# Patient Record
Sex: MALE | Race: WHITE | Employment: FULL TIME | URBAN - NONMETROPOLITAN AREA
[De-identification: names, ages, dates, MRNs, and addresses within clinical notes are randomized per-mention and may not be internally consistent; named-entity substitution may affect disease eponyms.]

---

## 2017-01-15 PROBLEM — R07.9 CHEST PAIN: Status: ACTIVE | Noted: 2017-01-15

## 2017-01-15 PROBLEM — Z98.890 S/P CARDIAC CATHETERIZATION: Status: ACTIVE | Noted: 2017-01-15

## 2017-01-15 PROBLEM — I21.4 NSTEMI (NON-ST ELEVATED MYOCARDIAL INFARCTION) (HCC): Status: ACTIVE | Noted: 2017-01-15

## 2017-01-15 PROBLEM — I30.1 ACUTE INFECTIVE PERICARDITIS: Status: ACTIVE | Noted: 2017-01-15

## 2017-01-17 ENCOUNTER — TELEPHONE (OUTPATIENT)
Dept: CARDIOLOGY | Age: 37
End: 2017-01-17

## 2017-01-25 ENCOUNTER — TELEPHONE (OUTPATIENT)
Dept: CARDIOLOGY | Age: 37
End: 2017-01-25

## 2017-03-29 ENCOUNTER — OFFICE VISIT (OUTPATIENT)
Dept: CARDIOLOGY | Age: 37
End: 2017-03-29

## 2017-03-29 VITALS
WEIGHT: 230.6 LBS | DIASTOLIC BLOOD PRESSURE: 84 MMHG | BODY MASS INDEX: 31.23 KG/M2 | HEART RATE: 76 BPM | HEIGHT: 72 IN | SYSTOLIC BLOOD PRESSURE: 138 MMHG

## 2017-03-29 DIAGNOSIS — Z98.890 S/P CARDIAC CATHETERIZATION: Primary | ICD-10-CM

## 2017-03-29 DIAGNOSIS — I21.4 NSTEMI (NON-ST ELEVATED MYOCARDIAL INFARCTION) (HCC): ICD-10-CM

## 2017-03-29 PROCEDURE — 93000 ELECTROCARDIOGRAM COMPLETE: CPT | Performed by: INTERNAL MEDICINE

## 2017-03-29 PROCEDURE — 99213 OFFICE O/P EST LOW 20 MIN: CPT | Performed by: INTERNAL MEDICINE

## 2017-03-29 ASSESSMENT — ENCOUNTER SYMPTOMS
GASTROINTESTINAL NEGATIVE: 1
RESPIRATORY NEGATIVE: 1
EYES NEGATIVE: 1

## 2017-10-14 ENCOUNTER — HOSPITAL ENCOUNTER (EMERGENCY)
Age: 37
Discharge: HOME | End: 2017-10-14
Attending: FAMILY MEDICINE
Payer: COMMERCIAL

## 2017-10-14 ENCOUNTER — APPOINTMENT (OUTPATIENT)
Dept: INTERVENTIONAL RADIOLOGY/VASCULAR | Age: 37
End: 2017-10-14
Payer: COMMERCIAL

## 2017-10-14 VITALS
SYSTOLIC BLOOD PRESSURE: 137 MMHG | HEIGHT: 72 IN | TEMPERATURE: 97.6 F | BODY MASS INDEX: 31.83 KG/M2 | DIASTOLIC BLOOD PRESSURE: 79 MMHG | RESPIRATION RATE: 18 BRPM | HEART RATE: 66 BPM | WEIGHT: 235 LBS | OXYGEN SATURATION: 96 %

## 2017-10-14 DIAGNOSIS — M79.89 PAIN AND SWELLING OF RIGHT LOWER LEG: ICD-10-CM

## 2017-10-14 DIAGNOSIS — M79.661 PAIN AND SWELLING OF RIGHT LOWER LEG: ICD-10-CM

## 2017-10-14 DIAGNOSIS — S86.111A GASTROCNEMIUS MUSCLE TEAR, RIGHT, INITIAL ENCOUNTER: ICD-10-CM

## 2017-10-14 DIAGNOSIS — M79.661 RIGHT CALF PAIN: Primary | ICD-10-CM

## 2017-10-14 PROCEDURE — 93971 EXTREMITY STUDY: CPT

## 2017-10-14 PROCEDURE — 99215 OFFICE O/P EST HI 40 MIN: CPT

## 2017-10-14 PROCEDURE — 99203 OFFICE O/P NEW LOW 30 MIN: CPT | Performed by: NURSE PRACTITIONER

## 2017-10-14 PROCEDURE — 99283 EMERGENCY DEPT VISIT LOW MDM: CPT

## 2017-10-14 ASSESSMENT — ENCOUNTER SYMPTOMS
ALLERGIC/IMMUNOLOGIC NEGATIVE: 1
GASTROINTESTINAL NEGATIVE: 1
BACK PAIN: 0
RESPIRATORY NEGATIVE: 1
EYES NEGATIVE: 1

## 2017-10-14 ASSESSMENT — PAIN DESCRIPTION - FREQUENCY: FREQUENCY: INTERMITTENT

## 2017-10-14 ASSESSMENT — PAIN DESCRIPTION - PAIN TYPE: TYPE: ACUTE PAIN

## 2017-10-14 ASSESSMENT — PAIN DESCRIPTION - ORIENTATION: ORIENTATION: RIGHT

## 2017-10-14 ASSESSMENT — PAIN DESCRIPTION - DESCRIPTORS: DESCRIPTORS: SHARP;SHOOTING

## 2017-10-14 ASSESSMENT — PAIN DESCRIPTION - LOCATION: LOCATION: LEG

## 2017-10-14 ASSESSMENT — PAIN SCALES - GENERAL: PAINLEVEL_OUTOF10: 7

## 2017-10-14 ASSESSMENT — PAIN DESCRIPTION - ONSET: ONSET: SUDDEN

## 2017-10-14 NOTE — ED PROVIDER NOTES
[facility]  Urgent Care Encounter      CHIEF COMPLAINT       Chief Complaint   Patient presents with    Leg Pain     Right Calf       Nurses Notes reviewed and I agree except as noted in the HPI. HISTORY OF PRESENT ILLNESS   Ata Sparrow is a 40 y.o. male who presents with complaint of right calf pain and swelling. Onset today. He reports that he has injured his right calf muscle several times over the last couple months. Initially he stepped wrong on his right foot and felt something pop in his right calf muscle. The pain resolved and never had any swelling. He reinjured his calf muscle again several weeks later, but again the pain resolved and never had any swelling. He reports that he stepped wrong today and felt something pop in his right calf. Has persistent pain and his calf has started to swell, came for evaluation. Denies any CP/SOB. REVIEW OF SYSTEMS     Review of Systems   Constitutional: Negative. HENT: Negative. Eyes: Negative. Respiratory: Negative. Cardiovascular: Negative. Gastrointestinal: Negative. Endocrine: Negative. Genitourinary: Negative. Musculoskeletal: Positive for gait problem, joint swelling and myalgias. Negative for arthralgias, back pain, neck pain and neck stiffness. Skin: Negative. Allergic/Immunologic: Negative. Hematological: Negative. Psychiatric/Behavioral: Negative. PAST MEDICAL HISTORY         Diagnosis Date    S/P cardiac catheterization: 1/15/2017: NOrmal coronaries. LVEF 50%. 1/15/2017    1/15/2017: NOrmal coronaries. LVEF 50%. Dr. Medardo Freeman     Patient  has no past surgical history on file.     CURRENT MEDICATIONS       Previous Medications    ASCORBIC ACID (VITAMIN C) 500 MG TABLET    Take 500 mg by mouth as needed    CHOLECALCIFEROL (VITAMIN D3) 2000 UNITS CAPS    Take 2 capsules by mouth daily    FENUGREEK PO    Take by mouth    THYME OIL OIL    by Does not apply route ALLERGIES     Patient is has No Known Allergies. FAMILY HISTORY     Patient's family history includes Heart Disease in his father. SOCIAL HISTORY     Patient  reports that he quit smoking about 9 years ago. His smoking use included Cigarettes. He has a 1.25 pack-year smoking history. He has never used smokeless tobacco. He reports that he does not drink alcohol or use drugs. PHYSICAL EXAM     ED TRIAGE VITALS  BP: 137/79, Temp: 98.6 °F (37 °C), Pulse: 76, Resp: 16, SpO2: 97 %  Physical Exam   Constitutional: He is oriented to person, place, and time. He appears well-developed and well-nourished. HENT:   Head: Normocephalic and atraumatic. Eyes: Pupils are equal, round, and reactive to light. Neck: Normal range of motion. Neck supple. Cardiovascular: Normal rate and regular rhythm. Pulmonary/Chest: Effort normal and breath sounds normal.   Abdominal: Soft. Bowel sounds are normal.   Musculoskeletal: Normal range of motion. He exhibits edema and tenderness. He exhibits no deformity. Right lower leg: He exhibits tenderness, swelling and edema. He exhibits no bony tenderness, no deformity and no laceration. Legs:  Neurological: He is alert and oriented to person, place, and time. Skin: Skin is warm and dry. Psychiatric: He has a normal mood and affect. His behavior is normal. Judgment and thought content normal.   Nursing note and vitals reviewed. DIAGNOSTIC RESULTS   Labs:No results found for this visit on 10/14/17. IMAGING:  No orders to display     URGENT CARE COURSE:     Vitals:    10/14/17 1826   BP: 137/79   Pulse: 76   Resp: 16   Temp: 98.6 °F (37 °C)   TempSrc: Temporal   SpO2: 97%   Weight: 235 lb (106.6 kg)   Height: 6' (1.829 m)       Medications - No data to display  PROCEDURES:  None  FINAL IMPRESSION      1. Right calf pain    2.  Pain and swelling of right lower leg      Doubt blood clot, likely swelling due to muscle injury  But needs to have blood clot

## 2017-10-14 NOTE — ED PROVIDER NOTES
[facility] [address] [address] EMERGENCY DEPT      CHIEF COMPLAINT       Chief Complaint   Patient presents with    Leg Pain     Right Calf       Nurses Notes reviewed and I agree except as noted in the HPI. HISTORY OF PRESENT ILLNESS    Veronika Lopez is a 40 y.o. male who presents to the emergency department from a local urgent care with pain and swelling in his right calf. The patient injured his right calf several weeks ago when he stepped from an 18 inch surface, felt a pop and assumed he pulled his calf muscle. Since then he has reinjured that area several times, always resulting in pain in the calf. This morning stepped off elevated step and felt pop in right calf. For the first time his right calf appears swollen to him. He took some ibuprofen and went to the urgent care where the provider was concerned for possible blood clot in the calf. In The emergency department the patient complains of pain with palpation or when he walks in his calf muscle that is improved with plantar and dorsiflexion. His wife was concerned and specifically requests an ultrasound be done of the leg. Pertinent past medical history is negative for previous DVTs or PEs. The patient does not use hormones, is not a smoker, has not been immobilized for any length of time. REVIEW OF SYSTEMS     Constitutional:  Denies fever, chills  Respiratory:  Denies shortness of breath  Cardiovascular:  Denies chest pain or palpitations  Musculoskeletal:  Denies pain proximal to the calf or distal to the calf  Skin:  Denies rash or bruising on the affected extremity   Neurologic:  Denies numbness  or tingling in the affected extremity     PAST MEDICAL HISTORY    has a past medical history of S/P cardiac catheterization: 1/15/2017: NOrmal coronaries. LVEF 50%. .    SURGICAL HISTORY      has no past surgical history on file.     CURRENT MEDICATIONS       Discharge Medication List as of 10/14/2017  9:48 PM      CONTINUE these medications which have NOT CHANGED    Details   FENUGREEK PO Take by mouth      Thyme Oil OIL by Does not apply route      Ascorbic Acid (VITAMIN C) 500 MG tablet Take 500 mg by mouth as needed      Cholecalciferol (VITAMIN D3) 2000 UNITS CAPS Take 2 capsules by mouth daily             ALLERGIES     has No Known Allergies. FAMILY HISTORY     indicated that his father is . family history includes Heart Disease in his father. SOCIAL HISTORY      reports that he quit smoking about 9 years ago. His smoking use included Cigarettes. He has a 1.25 pack-year smoking history. He has never used smokeless tobacco. He reports that he does not drink alcohol or use drugs. PHYSICAL EXAM     INITIAL VITALS:  height is 6' (1.829 m) and weight is 235 lb (106.6 kg). His temperature is 97.6 °F (36.4 °C). His blood pressure is 137/79 and his pulse is 66. His respiration is 18 and oxygen saturation is 96%. Constitutional:  Well developed, Well nourished, No acute distress, Non-toxic appearance. HENT:  Normocephalic, Atraumatic  Neck: Supple, normal range of motion. Respiratory:  Breathing is non-labored on room air, normal breath sounds, no wheezing, rhonchi, rales or cough. Cardiovascular:  Normal heart rate, Normal rhythm, No murmurs, No rubs, No gallops. GI:   No inguinal adenopathy or tenderness   Musculoskeletal: Right lower extremity has full range of motion at the hip, knee, ankle. Dorsalis pedis is 2+ and foot is warm and strong. There is tenderness diffusely in the calf muscle and focally in the medial aspect. Homans sign is significant for 2 beats of clonus. .  Integument:  Warm, Dry,  No rash (on exposed areas), no bruising. DIFFERENTIAL DIAGNOSIS:   Contusion versus muscle tear. Less likely DVT.     DIAGNOSTIC RESULTS       RADIOLOGY: non-plain film images(s) such as CT, Ultrasound and MRI are read by the radiologist.  Plain radiographic images are visualized and preliminarily interpreted by the emergency days  directly for further evaluation    [facility] [place] [address] [id number] [facility]:  Discharge Medication List as of 10/14/2017  9:48 PM          (Please note that portions of this note were completed with a voice recognition program.  Efforts were made to edit the dictations but occasionally words are mis-transcribed.)    MD Tanisha Moore MD  10/15/17 2671

## 2018-03-01 ENCOUNTER — TELEPHONE (OUTPATIENT)
Dept: CARDIOLOGY CLINIC | Age: 38
End: 2018-03-01

## 2018-03-07 ENCOUNTER — OFFICE VISIT (OUTPATIENT)
Dept: CARDIOLOGY CLINIC | Age: 38
End: 2018-03-07
Payer: COMMERCIAL

## 2018-03-07 VITALS
HEIGHT: 72 IN | SYSTOLIC BLOOD PRESSURE: 138 MMHG | WEIGHT: 227 LBS | DIASTOLIC BLOOD PRESSURE: 78 MMHG | BODY MASS INDEX: 30.75 KG/M2 | HEART RATE: 75 BPM

## 2018-03-07 DIAGNOSIS — Z98.890 S/P CARDIAC CATHETERIZATION: ICD-10-CM

## 2018-03-07 DIAGNOSIS — I21.4 NSTEMI (NON-ST ELEVATED MYOCARDIAL INFARCTION) (HCC): ICD-10-CM

## 2018-03-07 DIAGNOSIS — R00.2 PALPITATIONS: Primary | ICD-10-CM

## 2018-03-07 PROCEDURE — 99213 OFFICE O/P EST LOW 20 MIN: CPT | Performed by: INTERNAL MEDICINE

## 2018-03-07 PROCEDURE — 93000 ELECTROCARDIOGRAM COMPLETE: CPT | Performed by: INTERNAL MEDICINE

## 2018-03-07 ASSESSMENT — ENCOUNTER SYMPTOMS
RESPIRATORY NEGATIVE: 1
EYES NEGATIVE: 1
GASTROINTESTINAL NEGATIVE: 1

## 2018-03-07 NOTE — PROGRESS NOTES
Exam   Constitutional: He is oriented to person, place, and time. He appears well-developed and well-nourished. No distress. HENT:   Head: Normocephalic and atraumatic. Mouth/Throat: Oropharynx is clear and moist.   Eyes: Conjunctivae and EOM are normal. Pupils are equal, round, and reactive to light. No scleral icterus. Neck: Normal range of motion. Neck supple. No JVD present. No thyromegaly present. Cardiovascular: Normal rate, regular rhythm and normal heart sounds. Pulses:       Radial pulses are 2+ on the right side, and 2+ on the left side. Femoral pulses are 2+ on the right side, and 2+ on the left side. Popliteal pulses are 2+ on the right side, and 2+ on the left side. Dorsalis pedis pulses are 2+ on the right side, and 2+ on the left side. Posterior tibial pulses are 2+ on the right side, and 2+ on the left side. Pulmonary/Chest: Effort normal and breath sounds normal.   Abdominal: Soft. Bowel sounds are normal. He exhibits no mass. There is no tenderness. Musculoskeletal: He exhibits no edema. Lymphadenopathy:     He has no cervical adenopathy. Neurological: He is alert and oriented to person, place, and time. He has normal reflexes. No cranial nerve deficit. Skin: Skin is warm. No rash noted. Psychiatric: He has a normal mood and affect.        /78   Pulse 75   Ht 6' (1.829 m)   Wt 227 lb (103 kg)   BMI 30.79 kg/m²   Wt Readings from Last 3 Encounters:   03/07/18 227 lb (103 kg)   10/14/17 235 lb (106.6 kg)   03/29/17 230 lb 9.6 oz (104.6 kg)     BP Readings from Last 3 Encounters:   03/07/18 138/78   10/14/17 137/79   03/29/17 138/84       Lab Data  CBC:   Lab Results   Component Value Date    WBC 9.9 01/15/2017    RBC 4.72 01/15/2017    HGB 13.9 01/15/2017    HCT 40.8 01/15/2017    MCV 86.5 01/15/2017    MCH 29.5 01/15/2017    MCHC 34.1 01/15/2017    RDW 12.7 01/15/2017     01/15/2017    MPV 8.8 01/15/2017     CMP:    Lab Results Component Value Date     01/15/2017    K 4.5 01/15/2017    CL 98 01/15/2017    CO2 27 01/15/2017    BUN 15 01/15/2017    CREATININE 0.9 01/15/2017    LABGLOM >90 01/15/2017    GLUCOSE 135 01/15/2017    CALCIUM 8.5 01/15/2017     Hepatic Function Panel:  No results found for: ALKPHOS, ALT, AST, PROT, BILITOT, BILIDIR, IBILI, LABALBU  Magnesium:  No results found for: MG  PT/INR:  No results found for: PROTIME, INR  HgBA1c:  No results found for: LABA1C  FLP:    Lab Results   Component Value Date    TRIG 119 01/15/2017    TRIG 99 01/15/2017    HDL 46 01/15/2017    HDL 45 01/15/2017    LDLCALC 83 01/15/2017    LDLCALC 85 01/15/2017     TSH:    Lab Results   Component Value Date    TSH 2.370 01/15/2017     No results for input(s): CKTOTAL, CKMB, CKMBINDEX, TROPONINI in the last 72 hours. Assessment:   Mr. Shanna Friend is a 40 y.o. who is known to us with history of hospital admission with chest pain and positive troponin in 2017. He had normal cardiac cath. The patient presented with chest pain. He was transferred for [facility]. episode of chest pain. The patient was described as \"Feels like a pressure or tightness in center of my chest\". The patient reported that his father  oh MI at the age of 40  He denies recurrence of chest pain. Echocardiogram 2017:  Normal left ventricle size and systolic function. Ejection fraction was   estimated at 55 %. There were no regional left ventricular wall motion   abnormalities and wall thickness was within normal limits.   Left atrial size was normal.   The aortic valve was trileaflet with normal thickness and cuspal   separation.   Trivial tricuspid regurgitation visualized.   No evidence of any pericardial effusion. The following diagnoses were addressed during this visit:  1. Palpitations  EKG 12 Lead   2. NSTEMI (non-ST elevated myocardial infarction) (HCC)  EKG 12 Lead   3. S/P cardiac catheterization: 1/15/2017: NOrmal coronaries. LVEF 50%.   EKG 12

## 2018-03-12 ENCOUNTER — HOSPITAL ENCOUNTER (OUTPATIENT)
Dept: NON INVASIVE DIAGNOSTICS | Age: 38
Discharge: HOME | End: 2018-03-12
Payer: COMMERCIAL

## 2018-03-12 DIAGNOSIS — Z98.890 S/P CARDIAC CATHETERIZATION: ICD-10-CM

## 2018-03-12 DIAGNOSIS — I21.4 NSTEMI (NON-ST ELEVATED MYOCARDIAL INFARCTION) (HCC): ICD-10-CM

## 2018-03-12 DIAGNOSIS — R00.2 PALPITATIONS: ICD-10-CM

## 2018-03-12 PROCEDURE — 93226 XTRNL ECG REC<48 HR SCAN A/R: CPT

## 2018-03-12 PROCEDURE — 93225 XTRNL ECG REC<48 HRS REC: CPT

## 2018-03-17 NOTE — PROCEDURES
[address] [place] [address]                                  HOLTER MONITOR    PATIENT NAME: Geremias Wiley                    :        1980  MED REC NO:   [id number]                           ROOM:  ACCOUNT NO:   [de-identified]                           ADMIT DATE: 2018  PROVIDER:     Jeffrey Marcum. OZ Cali STUDY:  2018    DURATION:  48 hours. QUALITY:  Reasonable. INDICATION:  Palpitation. FINDINGS:  The patient is in normal sinus rhythm. Average heart rate is 75  beats per minute, ranging from 45 to 159 beats per minute. Maximum R to R  interval is 1.6 seconds at 08:30 a.m. There are 6 ventricular ectopic  beats, all isolated; 15 supraventricular ectopic beats of which 13  isolated, one couplet. No other form of arrhythmia. Diary presented and  three episodes of palpitation entry noted. One episode associated with one  supraventricular ectopic beat. The other episode associated and correlated  with sinus rhythm at 93 beats per minute. No ectopic beat. The other is  correlated with sinus tachycardia, rate 125 beats minute. Otherwise, the  patient also has other complaint of heart racing associated with sinus  tachycardia, rate 133 beats per minute. No symptoms when the patient has  fast heart rate of 159. CONCLUSION:  This is a normal sinus rhythm. Average heart rate 75 beats  per minute, ranging from 45 to 159 beats per minute. No significant pause  of more than 1.6 seconds noted. Six ventricular ectopic beats noted, all isolated. 15 supraventricular  ectopic beats noted of which 13 isolated, one couplet. On the Holter monitor, the palpitation and heart racing correlates with one  supraventricular ectopic beat with sinus rhythm and with sinus tachycardia,  so otherwise no significant arrhythmia which really explains the  palpitation of the patient. So, certainly, this Holter monitor does not  explain the palpitation of the patient. No ectopic beats really and most  of the palpitation correlates with sinus rhythm with normal rate or sinus  tachycardia. Otherwise, there is only one time that one ventricular  ectopic beat associated with palpitation, so basically, I feel there is no  correlation. In conclusion, no correlation noted. Lucy Lutz.  Leigh Ledesma M.D.    D: 03/16/2018 21:29:42       T: 03/17/2018 2:53:45     [email or web address]  Job#: [id number]     Doc#: [id number]    CC:

## 2018-03-20 ENCOUNTER — TELEPHONE (OUTPATIENT)
Dept: CARDIOLOGY CLINIC | Age: 38
End: 2018-03-20

## 2018-12-10 ENCOUNTER — TELEPHONE (OUTPATIENT)
Dept: CARDIOLOGY CLINIC | Age: 38
End: 2018-12-10

## 2019-01-31 ENCOUNTER — OFFICE VISIT (OUTPATIENT)
Dept: CARDIOLOGY CLINIC | Age: 39
End: 2019-01-31
Payer: COMMERCIAL

## 2019-01-31 VITALS
BODY MASS INDEX: 31.69 KG/M2 | HEIGHT: 72 IN | WEIGHT: 234 LBS | SYSTOLIC BLOOD PRESSURE: 123 MMHG | DIASTOLIC BLOOD PRESSURE: 80 MMHG | HEART RATE: 80 BPM

## 2019-01-31 DIAGNOSIS — I42.9 CARDIOMYOPATHY, UNSPECIFIED TYPE (HCC): Primary | ICD-10-CM

## 2019-01-31 PROCEDURE — 99213 OFFICE O/P EST LOW 20 MIN: CPT | Performed by: INTERNAL MEDICINE

## 2019-10-06 ENCOUNTER — HOSPITAL ENCOUNTER (EMERGENCY)
Age: 39
Discharge: HOME | End: 2019-10-06
Attending: EMERGENCY MEDICINE
Payer: COMMERCIAL

## 2019-10-06 VITALS
DIASTOLIC BLOOD PRESSURE: 78 MMHG | HEIGHT: 72 IN | BODY MASS INDEX: 31.83 KG/M2 | RESPIRATION RATE: 16 BRPM | TEMPERATURE: 97.7 F | OXYGEN SATURATION: 96 % | WEIGHT: 235 LBS | HEART RATE: 75 BPM | SYSTOLIC BLOOD PRESSURE: 137 MMHG

## 2019-10-06 DIAGNOSIS — S39.012A ACUTE MYOFASCIAL STRAIN OF LUMBOSACRAL REGION, INITIAL ENCOUNTER: Primary | ICD-10-CM

## 2019-10-06 LAB
BILIRUBIN URINE: NEGATIVE
BLOOD, URINE: NEGATIVE
CHARACTER, URINE: CLEAR
COLOR: YELLOW
GLUCOSE, URINE: NEGATIVE MG/DL
KETONES, URINE: NEGATIVE
LEUKOCYTES, UA: NEGATIVE
NITRATE, UA: NEGATIVE
PH UA: 7 (ref 5–9)
PROTEIN UA: NEGATIVE MG/DL
REFLEX TO URINE C & S: NORMAL
SPECIFIC GRAVITY UA: <= 1.005 (ref 1–1.03)
UROBILINOGEN, URINE: 0.2 EU/DL (ref 0–1)

## 2019-10-06 PROCEDURE — 99214 OFFICE O/P EST MOD 30 MIN: CPT

## 2019-10-06 PROCEDURE — 99213 OFFICE O/P EST LOW 20 MIN: CPT | Performed by: EMERGENCY MEDICINE

## 2019-10-06 PROCEDURE — 81003 URINALYSIS AUTO W/O SCOPE: CPT

## 2019-10-06 RX ORDER — TIZANIDINE 4 MG/1
4 TABLET ORAL EVERY 6 HOURS PRN
Qty: 15 TABLET | Refills: 0 | Status: SHIPPED | OUTPATIENT
Start: 2019-10-06 | End: 2020-01-13

## 2019-10-06 RX ORDER — NAPROXEN 500 MG/1
500 TABLET ORAL 2 TIMES DAILY WITH MEALS
Qty: 20 TABLET | Refills: 0 | Status: SHIPPED | OUTPATIENT
Start: 2019-10-06 | End: 2021-10-21

## 2019-10-06 ASSESSMENT — PAIN DESCRIPTION - LOCATION: LOCATION: BACK

## 2019-10-06 ASSESSMENT — ENCOUNTER SYMPTOMS
EYE PAIN: 0
VOMITING: 0
SHORTNESS OF BREATH: 0
STRIDOR: 0
DIARRHEA: 0
EYE DISCHARGE: 0
TROUBLE SWALLOWING: 0
BACK PAIN: 1
NAUSEA: 0
ABDOMINAL PAIN: 0
SINUS PRESSURE: 0
VOICE CHANGE: 0
WHEEZING: 0
SORE THROAT: 0
EYE REDNESS: 0
COUGH: 0

## 2019-10-06 ASSESSMENT — PAIN SCALES - GENERAL: PAINLEVEL_OUTOF10: 2

## 2020-01-13 ENCOUNTER — HOSPITAL ENCOUNTER (EMERGENCY)
Age: 40
Discharge: HOME | End: 2020-01-13
Payer: COMMERCIAL

## 2020-01-13 VITALS
BODY MASS INDEX: 31.15 KG/M2 | SYSTOLIC BLOOD PRESSURE: 132 MMHG | RESPIRATION RATE: 16 BRPM | HEIGHT: 72 IN | DIASTOLIC BLOOD PRESSURE: 84 MMHG | OXYGEN SATURATION: 97 % | HEART RATE: 75 BPM | WEIGHT: 230 LBS | TEMPERATURE: 98.4 F

## 2020-01-13 PROCEDURE — 99213 OFFICE O/P EST LOW 20 MIN: CPT | Performed by: NURSE PRACTITIONER

## 2020-01-13 PROCEDURE — 99212 OFFICE O/P EST SF 10 MIN: CPT

## 2020-01-13 RX ORDER — AMOXICILLIN AND CLAVULANATE POTASSIUM 875; 125 MG/1; MG/1
1 TABLET, FILM COATED ORAL 2 TIMES DAILY
Qty: 20 TABLET | Refills: 0 | Status: SHIPPED | OUTPATIENT
Start: 2020-01-13 | End: 2020-01-23

## 2020-01-13 ASSESSMENT — PAIN DESCRIPTION - LOCATION: LOCATION: EAR

## 2020-01-13 ASSESSMENT — ENCOUNTER SYMPTOMS
SHORTNESS OF BREATH: 0
EYE DISCHARGE: 0
EYE REDNESS: 0
VOMITING: 0
NAUSEA: 0
SINUS PRESSURE: 0
RHINORRHEA: 1
SORE THROAT: 0
FACIAL SWELLING: 0
TROUBLE SWALLOWING: 0
SINUS PAIN: 0
COUGH: 0

## 2020-01-13 ASSESSMENT — PAIN DESCRIPTION - ORIENTATION: ORIENTATION: LEFT

## 2020-01-13 ASSESSMENT — PAIN DESCRIPTION - PAIN TYPE: TYPE: ACUTE PAIN

## 2020-01-13 ASSESSMENT — PAIN SCALES - GENERAL: PAINLEVEL_OUTOF10: 2

## 2020-01-13 NOTE — ED PROVIDER NOTES
500 MG TABLET    Take 1 tablet by mouth 2 times daily (with meals) for 20 doses    NONFORMULARY           ALLERGIES     Patient is has No Known Allergies. FAMILY HISTORY     Patient'sfamily history includes Heart Disease in his father. SOCIAL HISTORY     Patient  reports that he quit smoking about 12 years ago. His smoking use included cigarettes. He has a 1.25 pack-year smoking history. He has never used smokeless tobacco. He reports that he does not drink alcohol or use drugs. PHYSICAL EXAM     ED TRIAGE VITALS  BP: 132/84, Temp: 98.4 °F (36.9 °C), Pulse: 75, Resp: 16, SpO2: 97 %  Physical Exam  Vitals signs and nursing note reviewed. Constitutional:       General: He is not in acute distress. Appearance: He is well-developed. He is not ill-appearing, toxic-appearing or diaphoretic. HENT:      Head: Normocephalic and atraumatic. Jaw: No trismus. Right Ear: Hearing, ear canal and external ear normal. No drainage, swelling or tenderness. A middle ear effusion is present. There is no impacted cerumen. No mastoid tenderness. No hemotympanum. Tympanic membrane is not erythematous or bulging. Left Ear: Hearing, ear canal and external ear normal. No drainage, swelling or tenderness. A middle ear effusion is present. There is no impacted cerumen. No mastoid tenderness. No hemotympanum. Tympanic membrane is erythematous and bulging. Nose: Mucosal edema and congestion present. No rhinorrhea. Right Sinus: No maxillary sinus tenderness or frontal sinus tenderness. Left Sinus: No maxillary sinus tenderness or frontal sinus tenderness. Mouth/Throat:      Pharynx: Uvula midline. No oropharyngeal exudate, posterior oropharyngeal erythema or uvula swelling. Tonsils: No tonsillar abscesses. Eyes:      General: No scleral icterus. Extraocular Movements: Extraocular movements intact.       Conjunctiva/sclera: Conjunctivae normal.      Pupils: Pupils are equal, round, and reactive to light. Neck:      Musculoskeletal: Normal range of motion and neck supple. Thyroid: No thyromegaly. Trachea: Trachea normal.   Cardiovascular:      Rate and Rhythm: Normal rate and regular rhythm. No extrasystoles are present. Chest Wall: PMI is not displaced. Heart sounds: Normal heart sounds. No murmur. No friction rub. No gallop. Pulmonary:      Effort: Pulmonary effort is normal. No accessory muscle usage or respiratory distress. Breath sounds: Normal breath sounds. Lymphadenopathy:      Head:      Right side of head: No submental, submandibular, tonsillar, preauricular, posterior auricular or occipital adenopathy. Left side of head: No submental, submandibular, tonsillar, preauricular, posterior auricular or occipital adenopathy. Cervical: No cervical adenopathy. Upper Body:      Right upper body: No supraclavicular adenopathy. Left upper body: No supraclavicular adenopathy. Skin:     General: Skin is warm and dry. Coloration: Skin is not pale. Findings: No rash. Comments: Skin intact, warm and dry to touch, no rashes noted on exposed surfaces. Neurological:      Mental Status: He is alert and oriented to person, place, and time. He is not disoriented. Cranial Nerves: Cranial nerves are intact. Psychiatric:         Mood and Affect: Mood normal.         Behavior: Behavior is cooperative. DIAGNOSTIC RESULTS   Labs: No results found for this visit on 01/13/20. IMAGING:  No orders to display     URGENT CARE COURSE:     Vitals:    01/13/20 1336   BP: 132/84   Pulse: 75   Resp: 16   Temp: 98.4 °F (36.9 °C)   SpO2: 97%   Weight: 230 lb (104.3 kg)   Height: 6' (1.829 m)       Medications - No data to display  PROCEDURES:  None  FINALIMPRESSION      1. Acute otitis media, left    2. Viral URI        DISPOSITION/PLAN   DISPOSITION Decision To Discharge 01/13/2020 02:01:43 PM    Nontoxic, no acute distress.   Exam

## 2021-10-21 ENCOUNTER — OFFICE VISIT (OUTPATIENT)
Dept: CARDIOLOGY CLINIC | Age: 41
End: 2021-10-21
Payer: COMMERCIAL

## 2021-10-21 VITALS
WEIGHT: 232 LBS | HEART RATE: 68 BPM | BODY MASS INDEX: 31.42 KG/M2 | SYSTOLIC BLOOD PRESSURE: 136 MMHG | DIASTOLIC BLOOD PRESSURE: 92 MMHG | HEIGHT: 72 IN

## 2021-10-21 DIAGNOSIS — R00.2 HEART PALPITATIONS: Primary | ICD-10-CM

## 2021-10-21 PROCEDURE — 93000 ELECTROCARDIOGRAM COMPLETE: CPT | Performed by: INTERNAL MEDICINE

## 2021-10-21 PROCEDURE — 99214 OFFICE O/P EST MOD 30 MIN: CPT | Performed by: INTERNAL MEDICINE

## 2021-10-21 NOTE — PROGRESS NOTES
[id number] [facility] [address] [place] [address]  Dept: [phone]  Dept Fax: [phone]  Loc: [phone]    Visit Date: 10/21/2021    Mr. Aftab Shaffer is a 39 y.o. male  who presented for:    HPI:   44 yo M c hx of perimyocardiditis is here for a follow up. He recently thinks he had covid. Has been having a lot of palpitations. Denies any chest pain, sob,  lightheadedness, dizziness, orthopnea, PND or pedal edema. Current Outpatient Medications:     NONFORMULARY, , Disp: , Rfl:     MAGNESIUM PO, Take by mouth daily, Disp: , Rfl:     Ascorbic Acid (VITAMIN C) 500 MG tablet, Take 500 mg by mouth as needed, Disp: , Rfl:     Cholecalciferol (VITAMIN D3) 2000 UNITS CAPS, Take 2 capsules by mouth daily, Disp: , Rfl:     Past Medical History  Patti Cabello  has a past medical history of S/P cardiac catheterization: 1/15/2017: NOrmal coronaries. LVEF 50%. .    Social History  Patti Cabello  reports that he quit smoking about 13 years ago. His smoking use included cigarettes. He has a 1.25 pack-year smoking history. He has never used smokeless tobacco. He reports that he does not drink alcohol and does not use drugs. Family History  Patti Cabello family history includes Heart Disease in his father. Past Surgical History   History reviewed. No pertinent surgical history. Subjective:     REVIEW OF SYSTEMS  Constitutional: denies sweats, chills and fever  HENT: denies  congestion, sinus pressure, sneezing and sore throat. Eyes: denies  pain, discharge, redness and itching. Respiratory: denies apnea, cough  Gastrointestinal: denies blood in stool, constipation, diarrhea   Endocrine: denies cold intolerance, heat intolerance, polydipsia. Genitourinary: denies dysuria, enuresis, flank pain and hematuria. Musculoskeletal: denies arthralgias, joint swelling and neck pain. Neurological: denies numbness and headaches.    Psychiatric/Behavioral: denies agitation, confusion, decreased concentration and dysphoric mood    All others reviewed and are negative. Objective:     BP (!) 136/92   Pulse 68   Ht 6' (1.829 m)   Wt 232 lb (105.2 kg)   BMI 31.46 kg/m²     Wt Readings from Last 3 Encounters:   10/21/21 232 lb (105.2 kg)   01/13/20 230 lb (104.3 kg)   10/06/19 235 lb (106.6 kg)     BP Readings from Last 3 Encounters:   10/21/21 (!) 136/92   01/13/20 132/84   10/06/19 137/78       PHYSICAL EXAM  Constitutional: Oriented to person, place, and time. Appears well-developed and well-nourished. HENT:   Head: Normocephalic and atraumatic. Eyes: EOM are normal. Pupils are equal, round, and reactive to light. Neck: Normal range of motion. Neck supple. No JVD present. Cardiovascular: Normal rate , normal heart sounds and intact distal pulses. Pulmonary/Chest: Effort normal and breath sounds normal. No respiratory distress. No wheezes. No rales. Abdominal: Soft. Bowel sounds are normal. No distension. There is no tenderness. Musculoskeletal: Normal range of motion. No edema. Neurological: Alert and oriented to person, place, and time. No cranial nerve deficit. Coordination normal.   Skin: Skin is warm and dry. Psychiatric: Normal mood and affect.        No results found for: CKTOTAL, CKMB, CKMBINDEX    Lab Results   Component Value Date    WBC 9.9 01/15/2017    RBC 4.72 01/15/2017    HGB 13.9 01/15/2017    HCT 40.8 01/15/2017    MCV 86.5 01/15/2017    MCH 29.5 01/15/2017    MCHC 34.1 01/15/2017    RDW 12.7 01/15/2017     01/15/2017    MPV 8.8 01/15/2017       Lab Results   Component Value Date     01/15/2017    K 4.5 01/15/2017    CL 98 01/15/2017    CO2 27 01/15/2017    BUN 15 01/15/2017    CREATININE 0.9 01/15/2017    CALCIUM 8.5 01/15/2017    LABGLOM >90 01/15/2017    GLUCOSE 135 01/15/2017       No results found for: ALKPHOS, ALT, AST, PROT, BILITOT, BILIDIR, IBILI, LABALBU    No results found for: MG    No results found for: INR, PROTIME      No results found for: LABA1C    Lab Results   Component Value Date    TRIG 119 01/15/2017    HDL 46 01/15/2017    LDLCALC 83 01/15/2017       Lab Results   Component Value Date    TSH 2.370 01/15/2017         Testing Reviewed:      I haveindividually reviewed the below cardiac tests    EKG:    ECHO: Results for orders placed during the hospital encounter of 01/15/17    Echocardiogram 2D/ M-Mode/ Colorflow/ Do    Narrative  Transthoracic Echocardiography Report (TTE)    Demographics    Patient Name    Sharlene Leigh  Gender               Male  D    MR #            [id number]      Race                     Ethnicity    Account #       [de-identified]        Room Number          0008    Accession       [id number]      Date of Study        01/16/2017  Number    Date of Birth   1980     Referring Physician  Ami Goss MD    Age             39 year(s)     Sonographer          Addison Heath    Interpreting         Verenice Tai MD  Physician    Procedure    Type of Study    TTE procedure:ECHOCARDIOGRAM COMPLETE 2D W DOPPLER W COLOR. Procedure Date  Date: 01/16/2017 Start: 07:43 AM    Study Location: Bedside  Technical Quality: Good visualization    Indications:Rule out pericarditis. Additional Medical History:Chest pain, NSTEMI, S/P cardiac cath, ex smoker    Patient Status: Routine    Height: 72 inches Weight: 235 pounds BSA: 2.28 m^2 BMI: 31.87 kg/m^2    BP: 130/81 mmHg    Allergies  - No Known Allergies. Conclusions    Summary  Normal left ventricle size and systolic function. Ejection fraction was  estimated at 55 %. There were no regional left ventricular wall motion  abnormalities and wall thickness was within normal limits. Left atrial size was normal.  The aortic valve was trileaflet with normal thickness and cuspal  separation. Trivial tricuspid regurgitation visualized.   No evidence of any pericardial effusion. Signature    ----------------------------------------------------------------  Electronically signed by Luisito Rangel MD (Interpreting  physician) on 01/16/2017 at 04:14 PM  ----------------------------------------------------------------    Findings    Mitral Valve  No mitral regurgitation is present. Aortic Valve  The aortic valve was trileaflet with normal thickness and cuspal  separation. Tricuspid Valve  Trivial tricuspid regurgitation visualized. Pulmonic Valve  Trivial pulmonic regurgitation visualized. Left Atrium  Left atrial size was normal.    Left Ventricle  Normal left ventricle size and systolic function. Ejection fraction was  estimated at 55 %. There were no regional left ventricular wall motion  abnormalities and wall thickness was within normal limits. Right Atrium  Right atrial size was normal.    Right Ventricle  The right ventricular size was normal with normal systolic function and  wall thickness. Pericardial Effusion  No evidence of any pericardial effusion.     M-Mode/2D Measurements & Calculations    LV Diastolic    LV Systolic Dimension: 4.1  AV Cusp Separation: 2.3 cmLA  Dimension: 5.7  cm                          Dimension: 3.7 cmAO Root  cm              LV Volume Diastolic: 525 ml Dimension: 2.9 cm  LV FS:28.1 %    LV Volume Systolic: 27.6 ml  LV PW           LV EDV/LV EDV Index: 440  Diastolic: 1 cm JE/37 Q^9FR ESV/LV ESV  Septum          Index: 74.2 ml/33 m^2       RV Diastolic Dimension: 3.2 cm  Diastolic: 0.9  EF Calculated: 53.6 %  cm                                          LA/Aorta: 1.28    Doppler Measurements & Calculations    MV Peak E-Wave: 102 cm/s   AV Peak Velocity: 146 LVOT Peak Velocity: 117  MV Peak A-Wave: 106 cm/s   cm/s                  cm/s  MV E/A Ratio: 0.96         AV Peak Gradient:     LVOT Peak Gradient: 5  MV Peak Gradient: 4.16     8.53 mmHg             mmHg  mmHg  TV Peak E-Wave: 77.1 cm/s  MV Deceleration Time: 103 TV Peak A-Wave: 77.1 cm/s  msec  IVRT: 32 msec         TV Peak Gradient: 2.38  mmHg  MV E' Septal Velocity: 17                        TR Velocity:256 cm/s  cm/s                       AV DVI (Vmax):0.8     TR Gradient:26.21 mmHg  MV A' Septal Velocity:                           PV Peak Velocity: 80 cm/s  16.1 cm/s                                        PV Peak Gradient: 2.56  MV E' Lateral Velocity:                          mmHg  10.6 cm/s  MV A' Lateral Velocity:  12.5 cm/s                                        IA ED Velocity: 118 cm/s  E/E' septal: 6  E/E' lateral: 9.62    [id number]      STRESS:    CATH:    Assessment/Plan       Diagnosis Orders   1. Heart palpitations  EKG 12 Lead     Palpitations  Recent COVID symptoms   Hx Peripartum cardiomyopathy     Has been having a lot of palpitations  48 hr holter monitor and Echo  Has hx of cardiomyopathy, with EF improving back to 55%  The patient is asked to make an attempt to improve diet and exercise patterns to aid in medical management of this problem. Advised more plant based nutrition/meditarrean diet   Advised patient to call office or seek immediate medical attention if there is any new onset of  any chest pain, sob, palpitations, lightheadedness, dizziness, orthopnea, PND or pedal edema. All medication side effects were discussed in details.     Thank youfor allowing me to participate in the care of this patient. Please do not hesitate to contact me for any further questions. Return in about 3 weeks (around 11/11/2021), or if symptoms worsen or fail to improve, for Review testing, Regular follow up.        Electronically signed by Melisa Whalen MD [facility]  10/21/2021 at 10:23 AM EDT

## 2021-10-21 NOTE — PROGRESS NOTES
Pt here for check up - heart palpitations    Pt c/o increased heart palpitations , notice more over last     3 weeks, indigestion , watching diet, gets bloated  After eating     Pt denies dizziness, sob, swelling in legs and feet     Pt feels under more stress     Pt brought EKG QRS complex progressively widening every years from 2014.  Labs brought in too    EKG done today

## 2021-10-29 ENCOUNTER — HOSPITAL ENCOUNTER (OUTPATIENT)
Dept: NON INVASIVE DIAGNOSTICS | Age: 41
Discharge: HOME | End: 2021-10-29
Payer: COMMERCIAL

## 2021-10-29 DIAGNOSIS — R00.2 HEART PALPITATIONS: ICD-10-CM

## 2021-10-29 LAB
LV EF: 53 %
LVEF MODALITY: NORMAL

## 2021-10-29 PROCEDURE — 93226 XTRNL ECG REC<48 HR SCAN A/R: CPT

## 2021-10-29 PROCEDURE — 93225 XTRNL ECG REC<48 HRS REC: CPT

## 2021-10-29 PROCEDURE — 93306 TTE W/DOPPLER COMPLETE: CPT

## 2021-11-08 LAB
ACQUISITION DURATION: NORMAL S
AVERAGE HEART RATE: 75 BPM
HOOKUP DATE: NORMAL
HOOKUP TIME: NORMAL
MAX HEART RATE TIME/DATE: NORMAL
MAX HEART RATE: 137 BPM
MIN HEART RATE TIME/DATE: NORMAL
MIN HEART RATE: 51 BPM
NUMBER OF QRS COMPLEXES: NORMAL
NUMBER OF SUPRAVENTRICULAR COUPLETS: 0
NUMBER OF SUPRAVENTRICULAR ECTOPICS: 214
NUMBER OF SUPRAVENTRICULAR ISOLATED BEATS: 122
NUMBER OF VENTRICULAR BIGEMINAL CYCLES: 0
NUMBER OF VENTRICULAR COUPLETS: 0
NUMBER OF VENTRICULAR ECTOPICS: 240